# Patient Record
Sex: FEMALE | Race: WHITE | Employment: FULL TIME | URBAN - METROPOLITAN AREA
[De-identification: names, ages, dates, MRNs, and addresses within clinical notes are randomized per-mention and may not be internally consistent; named-entity substitution may affect disease eponyms.]

---

## 2019-08-19 ENCOUNTER — APPOINTMENT (OUTPATIENT)
Dept: GENERAL RADIOLOGY | Facility: CLINIC | Age: 44
End: 2019-08-19
Attending: EMERGENCY MEDICINE
Payer: COMMERCIAL

## 2019-08-19 ENCOUNTER — HOSPITAL ENCOUNTER (EMERGENCY)
Facility: CLINIC | Age: 44
Discharge: HOME | End: 2019-08-20
Attending: EMERGENCY MEDICINE | Admitting: EMERGENCY MEDICINE
Payer: COMMERCIAL

## 2019-08-19 DIAGNOSIS — I10 HYPERTENSION, UNSPECIFIED TYPE: ICD-10-CM

## 2019-08-19 DIAGNOSIS — S00.81XA ABRASION OF CHIN WITH INFECTION, INITIAL ENCOUNTER: ICD-10-CM

## 2019-08-19 DIAGNOSIS — L08.9 ABRASION OF CHIN WITH INFECTION, INITIAL ENCOUNTER: ICD-10-CM

## 2019-08-19 DIAGNOSIS — S63.501A WRIST SPRAIN, RIGHT, INITIAL ENCOUNTER: ICD-10-CM

## 2019-08-19 DIAGNOSIS — S60.10XA SUBUNGUAL HEMATOMA OF DIGIT OF HAND, INITIAL ENCOUNTER: ICD-10-CM

## 2019-08-19 DIAGNOSIS — S00.33XA CONTUSION OF NOSE, INITIAL ENCOUNTER: ICD-10-CM

## 2019-08-19 PROCEDURE — 29125 APPL SHORT ARM SPLINT STATIC: CPT | Mod: RT

## 2019-08-19 PROCEDURE — 11740 EVACUATION SUBUNGUAL HMTMA: CPT

## 2019-08-19 PROCEDURE — 73110 X-RAY EXAM OF WRIST: CPT | Mod: RT

## 2019-08-19 PROCEDURE — 25000132 ZZH RX MED GY IP 250 OP 250 PS 637: Performed by: EMERGENCY MEDICINE

## 2019-08-19 PROCEDURE — 73130 X-RAY EXAM OF HAND: CPT | Mod: RT

## 2019-08-19 PROCEDURE — 99284 EMERGENCY DEPT VISIT MOD MDM: CPT | Mod: 25

## 2019-08-19 RX ORDER — NAPROXEN 500 MG/1
500 TABLET ORAL 2 TIMES DAILY WITH MEALS
COMMUNITY

## 2019-08-19 RX ORDER — ERGOCALCIFEROL 1.25 MG/1
50000 CAPSULE, LIQUID FILLED ORAL WEEKLY
COMMUNITY

## 2019-08-19 RX ORDER — FENOFIBRATE 120 MG/1
67 TABLET ORAL
COMMUNITY

## 2019-08-19 RX ORDER — PRAVASTATIN SODIUM 20 MG
20 TABLET ORAL DAILY
COMMUNITY

## 2019-08-19 RX ORDER — HYDROCODONE BITARTRATE AND ACETAMINOPHEN 5; 325 MG/1; MG/1
1 TABLET ORAL ONCE
Status: COMPLETED | OUTPATIENT
Start: 2019-08-19 | End: 2019-08-19

## 2019-08-19 RX ORDER — ASPIRIN 81 MG/1
81 TABLET ORAL DAILY
COMMUNITY

## 2019-08-19 RX ORDER — GLIMEPIRIDE 1 MG/1
0.5 TABLET ORAL
COMMUNITY

## 2019-08-19 RX ORDER — PROPRANOLOL HYDROCHLORIDE 120 MG/1
120 CAPSULE, EXTENDED RELEASE ORAL DAILY
COMMUNITY

## 2019-08-19 RX ORDER — LISINOPRIL 2.5 MG/1
2.5 TABLET ORAL DAILY
COMMUNITY

## 2019-08-19 RX ADMIN — HYDROCODONE BITARTRATE AND ACETAMINOPHEN 1 TABLET: 5; 325 TABLET ORAL at 23:35

## 2019-08-19 ASSESSMENT — MIFFLIN-ST. JEOR: SCORE: 1227.88

## 2019-08-19 NOTE — ED AVS SNAPSHOT
Emergency Department  [address] [place] [id number]  Phone:  [phone]  Fax:  [phone]                                    Judith Shea   MRN: [id number]    Department:   Emergency Department   Date of Visit:  8/19/2019           After Visit Summary Signature Page    I have received my discharge instructions, and my questions have been answered. I have discussed any challenges I see with this plan with the nurse or doctor.    ..........................................................................................................................................  Patient/Patient Representative Signature      ..........................................................................................................................................  Patient Representative Print Name and Relationship to Patient    ..................................................               ................................................  Date                                   Time    ..........................................................................................................................................  Reviewed by Signature/Title    ...................................................              ..............................................  Date                                               Time          22EPIC Rev 08/18

## 2019-08-20 VITALS
RESPIRATION RATE: 16 BRPM | WEIGHT: 146 LBS | SYSTOLIC BLOOD PRESSURE: 187 MMHG | DIASTOLIC BLOOD PRESSURE: 111 MMHG | TEMPERATURE: 98 F | HEIGHT: 59 IN | BODY MASS INDEX: 29.43 KG/M2 | OXYGEN SATURATION: 98 %

## 2019-08-20 ASSESSMENT — ENCOUNTER SYMPTOMS
WOUND: 1
ARTHRALGIAS: 1
NUMBNESS: 0
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
## 2021-07-12 ENCOUNTER — THERAPY VISIT (OUTPATIENT)
Dept: OCCUPATIONAL THERAPY | Facility: CLINIC | Age: 46
End: 2021-07-12
Payer: COMMERCIAL

## 2021-07-12 DIAGNOSIS — M79.644 PAIN OF RIGHT THUMB: ICD-10-CM

## 2021-07-12 PROCEDURE — 97165 OT EVAL LOW COMPLEX 30 MIN: CPT | Mod: GO | Performed by: OCCUPATIONAL THERAPIST

## 2021-07-12 PROCEDURE — 97760 ORTHOTIC MGMT&TRAING 1ST ENC: CPT | Mod: GO | Performed by: OCCUPATIONAL THERAPIST

## 2021-07-12 NOTE — PROGRESS NOTES
Hand Therapy Initial Evaluation    Current Date:  7/12/2021  Referring Physician:Mora Bond MD    Diagnosis: Right thumb pain  DOI: 5/1/21    Subjective:  Judith Shea is a 45 year old right hand dominant female.    Patient reports symptoms of pain, stiffness/loss of motion, weakness/loss of strength, edema, numbness and tingling  of the right thumb which occurred due to an unknown etiology. Since onset symptoms are Unchanged  Special tests:  x-ray.  Previous treatment: brace .    General health as reported by patient is fair.  Pertinent medical history includes:Diabetes, High Blood Pressure, Menopausal, Numbness/Tingling, Smoking  Medical allergies:penicillin, codine.  Surgical history: orthopedic: (R) elbow, (R) thumb, (B) knees (remove cartilage), other: hysterectomy, multiple ear surgeries.  Medication history: Anti-depressants, High Blood Pressure, high cholesterol, and diabetes.    Occupational Profile Information:  Current occupation is none  Job Tasks: housekeeping tasks  Prior functional level:  no limitations  Barriers include:none  Mobility: No difficulty  Transportation: drives  Leisure activities/hobbies: anjelica, reading, video games    Upper Extremity Functional Index Score:  SCORE:   Column Totals: /80: 58   (A lower score indicates greater disability.)    Objective:  Pain Level (Scale 0-10):   7/12/2021   At Rest 3/10   With Use 2/10     Pain Description:  Date 7/12/2021   Location Thumb MP joint area (ulnar side and A1 pulley)   Pain Quality Aching, Shooting and needle like   Frequency intermittent     Pain is worst  daytime   Exacerbated by  pressure on the ulnar side of the MCP joint,   Relieved by movement   Progression unchanged     ROM  Thumb 7/12/2021 7/12/2021   AROM  (PROM) Left Right   MP /40 /40   IP /65 /65+   RABD 40 40   PABD 45 55   Kapandji Opposition Scale (0-10/10) 9/10 7/10     Thumb Observation/Appearance  Key: + = present/ - = not observed    7/12/2021   Noted  collapse of MP into hyperextension during pinch R:-  L:+   Tenderness at UCL and A1 pulley R:++        Provocative Tests  Pain Report:  - none    + mild    ++ moderate    +++ severe     7/12/2021   MCP ulnar Stress Test R: ++  Moderate UCL laxity compared to (L)     Strength   (Measured in pounds)  Pain Report: - none  + mild    ++ moderate    +++ severe    7/12/2021 7/12/2021   Trials Left Right   1  2  3 45  42  41 19++   Average 43 19+     Lat Pinch 7/12/2021 7/12/2021   Trials Left Right   1  2  3 12 7   Average       3 Pt Pinch 7/12/2021 7/12/2021   Trials Left Right   1  2  3 16 8   Average       Sensation:  Patient reports numbness in thumb tip with pressure on the ulnar MCP area    Assessment:  Patient presents with symptoms consistent with diagnosis of thumb pain, with conservative intervention.    Patient s limitations or Problem List includes: Pain, Decreased ROM/motion, weakness, decreased stability of the MCP joint, decreased  and pinch strength of the thumb which interferes with patients ability to perform  dressing, home maintenance and sports/recreational as compared to previous level of function.    Rehab Potential:  Excellent - Return to full activity, no limitations    Patient will benefit from skilled Occupational Therapy to increase ROM, overall strength, stability of thumb and sensation and decrease pain to return to previous activity level and resume normal daily tasks and to reach their rehab potential.    Barriers to Learning:  No barrier    Communication Issues:  Patient appears to be able to clearly communicate and understand verbal and written communication and follow directions correctly.    Chart Review: Chart Review, Brief history including review of medical and/or therapy records relating to the presenting problem and Simple history review with patient    Identified Performance Deficits: dressing, home establishment and management, meal preparation and cleanup, sleep and  leisure activities    Assessment of Occupational Performance:  5 or more Performance Deficits    Clinical Decision Making (Complexity): Low complexity    Treatment Explanation:  The following has been discussed with the patient:  RX ordered/plan of care  Anticipated outcomes  Possible risks and side effects    Plan:  Frequency:  2 X a month, once daily  Duration:  for 6 visits    Treatment Plan:  Modalities:  Paraffin  Therapeutic Exercise: AROM, Isometrics, and Stabilization exercises of the Thumb MP, including active and resisted abduction, 1st DI strengthening  Manual Techniques:  self MFR to thumb adductor with clip  Orthosis fabrication:  Hand based Thumb Spica,  Education: Anatomy of CMC, joint protection principles, adaptive equipment as needed    Discharge Plan:  Achieve all LTG  [place] in home treatment program.  Reach maximal therapeutic benefit.    Home Program:  Hand based Thumb Spica orthosis night/sleeping and per symptoms during the day     Next Visit: 6 weeks  Compliance with full time wear of orthosis for 6 weeks  MCP thumb stabilization exercises after 6 weeks

## 2021-08-24 ENCOUNTER — THERAPY VISIT (OUTPATIENT)
Dept: OCCUPATIONAL THERAPY | Facility: CLINIC | Age: 46
End: 2021-08-24
Payer: COMMERCIAL

## 2021-08-24 DIAGNOSIS — M79.644 PAIN OF RIGHT THUMB: ICD-10-CM

## 2021-08-24 PROCEDURE — 97140 MANUAL THERAPY 1/> REGIONS: CPT | Mod: GO | Performed by: OCCUPATIONAL THERAPIST

## 2021-08-24 PROCEDURE — 97110 THERAPEUTIC EXERCISES: CPT | Mod: GO | Performed by: OCCUPATIONAL THERAPIST

## 2021-08-24 NOTE — PROGRESS NOTES
SOAP Note - Hand Therapy - Objective Information    Current Date:  8/24/2021  Referring Physician:Mora Bond MD    Diagnosis: Right thumb pain  DOI: 5/1/21    Judith Shea is a 45 year old right hand dominant female.    Patient reports symptoms of pain, stiffness/loss of motion, weakness/loss of strength, edema, numbness and tingling  of the right thumb which occurred due to an unknown etiology.     Occupational Profile Information:  Current occupation is none    S:  Subjective changes as noted by patient: The thumb is doing better. No pain  Functional changes noted by patient: Wearing splint consistently and not using thumb.      O:  Pain Level (Scale 0-10):   7/12/2021 8/24/21   At Rest 3/10 0/10   With Use 2/10 0/10     Pain Description:  Date 7/12/2021   Location Thumb MP joint area (ulnar side and A1 pulley)   Pain Quality Aching, Shooting and needle like   Frequency intermittent     Pain is worst  daytime   Exacerbated by  pressure on the ulnar side of the MCP joint,   Relieved by movement   Progression unchanged     ROM  Thumb 7/12/2021 7/12/2021 8/24/21   AROM  (PROM) Left Right Right   MP /40 /40 4   IP /65 /65+ 53   RABD 40 40 35   PABD 45 55 40   Kapandji Opposition Scale (0-10/10) 9/10 7/10 8/10     Thumb Observation/Appearance  Key: + = present/ - = not observed    7/12/2021 8/24/21   Noted collapse of MP into hyperextension during pinch R:-  L:+    Tenderness at UCL and A1 pulley R:++   R -      Provocative Tests  Pain Report:  - none    + mild    ++ moderate    +++ severe     7/12/2021 8/24/21   MCP ulnar Stress Test R: ++  Moderate UCL laxity compared to (L) R -  Mild UCL laxity compared to (L)     Strength   (Measured in pounds)  Pain Report: - none  + mild    ++ moderate    +++ severe    7/12/2021 7/12/2021   Trials Left Right   1  2  3 45  42  41 19++   Average 43 19+     Lat Pinch 7/12/2021 7/12/2021   Trials Left Right   1  2  3 12 7   Average       3 Pt Pinch 7/12/2021  7/12/2021   Trials Left Right   1  2  3 16 8   Average       Sensation:  Patient reports numbness in thumb tip with pressure on the ulnar MCP area    Please refer to the daily flowsheet for treatment provided today.     Home Program:  Hand based Thumb Spica orthosis night/sleeping and per symptoms during the day    AROM of thumb all planes  MFR to thumb adductor    Next Visit:  No further visits scheduled at this time. The chart will be left open for one month to allow patient to schedule further appointments if problems develop. If no additional therapy sessions are scheduled, then the patient will be discharged and this will serve as a discharge note.

## 2021-11-18 PROBLEM — M79.644 PAIN OF RIGHT THUMB: Status: RESOLVED | Noted: 2021-07-12 | Resolved: 2021-11-18
